# Patient Record
Sex: FEMALE | Race: WHITE | Employment: FULL TIME | URBAN - NONMETROPOLITAN AREA
[De-identification: names, ages, dates, MRNs, and addresses within clinical notes are randomized per-mention and may not be internally consistent; named-entity substitution may affect disease eponyms.]

---

## 2017-01-12 PROBLEM — F41.9 ANXIETY: Status: ACTIVE | Noted: 2017-01-12

## 2020-01-14 PROBLEM — N89.8 VAGINAL DISCHARGE: Status: ACTIVE | Noted: 2020-01-14

## 2020-01-14 PROBLEM — Z12.4 PAP SMEAR FOR CERVICAL CANCER SCREENING: Status: ACTIVE | Noted: 2020-01-14

## 2020-01-14 PROBLEM — N63.0 BREAST MASS IN FEMALE: Status: ACTIVE | Noted: 2020-01-14

## 2020-02-13 PROBLEM — Z12.4 PAP SMEAR FOR CERVICAL CANCER SCREENING: Status: RESOLVED | Noted: 2020-01-14 | Resolved: 2020-02-13

## 2021-05-12 PROBLEM — Z00.00 ENCOUNTER FOR MEDICAL EXAMINATION TO ESTABLISH CARE: Status: ACTIVE | Noted: 2021-05-12

## 2021-05-12 PROBLEM — Z23 NEED FOR PROPHYLACTIC VACCINATION AGAINST STREPTOCOCCUS PNEUMONIAE (PNEUMOCOCCUS): Status: ACTIVE | Noted: 2021-05-12

## 2022-01-11 PROBLEM — Z76.0 MEDICATION REFILL: Status: RESOLVED | Noted: 2021-05-12 | Resolved: 2022-01-11

## 2022-01-11 PROBLEM — N89.8 VAGINAL DISCHARGE: Status: RESOLVED | Noted: 2020-01-14 | Resolved: 2022-01-11

## 2023-01-12 ENCOUNTER — OFFICE VISIT (OUTPATIENT)
Dept: OBGYN CLINIC | Age: 38
End: 2023-01-12
Payer: COMMERCIAL

## 2023-01-12 VITALS
SYSTOLIC BLOOD PRESSURE: 120 MMHG | DIASTOLIC BLOOD PRESSURE: 80 MMHG | HEIGHT: 66 IN | WEIGHT: 174.3 LBS | BODY MASS INDEX: 28.01 KG/M2

## 2023-01-12 DIAGNOSIS — Z83.3 FAMILY HISTORY OF DIABETES MELLITUS: ICD-10-CM

## 2023-01-12 DIAGNOSIS — Z13.29 SCREENING FOR THYROID DISORDER: ICD-10-CM

## 2023-01-12 DIAGNOSIS — Z13.29 SCREENING FOR ENDOCRINE DISORDER: ICD-10-CM

## 2023-01-12 DIAGNOSIS — Z13.89 ENCOUNTER FOR SCREENING FOR OTHER DISORDER: ICD-10-CM

## 2023-01-12 DIAGNOSIS — Z31.49 PROCREATION MANAGEMENT INVESTIGATION AND TESTING: Primary | ICD-10-CM

## 2023-01-12 PROCEDURE — 99203 OFFICE O/P NEW LOW 30 MIN: CPT | Performed by: ADVANCED PRACTICE MIDWIFE

## 2023-01-12 RX ORDER — VENLAFAXINE 75 MG/1
75 TABLET ORAL 3 TIMES DAILY
COMMUNITY

## 2023-01-12 RX ORDER — HYDROXYZINE HYDROCHLORIDE 25 MG/1
25 TABLET, FILM COATED ORAL PRN
COMMUNITY

## 2023-01-12 RX ORDER — BUSPIRONE HYDROCHLORIDE 7.5 MG/1
7.5 TABLET ORAL 3 TIMES DAILY
COMMUNITY

## 2023-01-12 ASSESSMENT — ENCOUNTER SYMPTOMS
RESPIRATORY NEGATIVE: 1
GASTROINTESTINAL NEGATIVE: 1

## 2023-01-12 NOTE — PROGRESS NOTES
PROBLEM VISIT     Date of service: 2023    Cielo House  Is a 30 y.o.  female    PT's PCP is: No primary care provider on file. : 1985                                          HPI New patient here with spouse Pricila Finch) to discuss TTC. Reports has been trying to conceive since between April and 2022. Just recently started BBT at home, uses the [employer] kenny, and has tried [facility] tests (highest 45 per patient \"but just makes me stressed and discouraged\". PCP - name unknown but reports is in [facility] (nothing in [facility] [address]) and is aware of TTC and medications - Buspar, Effexor. Taking OTC PNV. Due for annual - at least 10 years since last pap smear. Patient history:  Cycle onset age 17, regular, bleeds approx 5 days (3 moderate flow, 2 light days). Dysmenorrhea day prior and first day of bleeding. Some clots present - often small, has had quarter size once or twice in the past.   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 Denies pain with BM's. Around mid cycle has sex every other day  ETOH approx once per month  No drug/tobacco use. Partner - Surya Del Valle - 35years old  135#, 5'8\"  No children, healthy  No drug/tobacco use  ETOH 1-2/day  No hx of genital trauma or STI's  Exercise - cross fit  Diet - low vegetables, energy drinks, meat, potatoes, rice      Review of Systems   Constitutional: Negative. HENT: Negative. Respiratory: Negative. Gastrointestinal: Negative. Genitourinary:  Positive for menstrual problem (dysmenorrhea day prior to menses and first day of menses). Musculoskeletal: Negative. Neurological: Negative. Psychiatric/Behavioral: Negative. Patient's last menstrual period was 2023 (exact date). OB History    Para Term  AB Living   0 0 0 0 0 0   SAB IAB Ectopic Molar Multiple Live Births   0 0 0 0 0 0        Social History     Tobacco Use   Smoking Status Never   Smokeless Tobacco Never        Social History     Substance and Sexual Activity   Alcohol Use Yes    Comment: once a month       Allergies: Patient has no known allergies. Current Outpatient Medications:     Prenatal MV-Min-Fe Fum-FA-DHA (PRENATAL 1 PO), Take by mouth, Disp: , Rfl:     hydrOXYzine HCl (ATARAX) 25 MG tablet, Take 25 mg by mouth as needed for Itching, Disp: , Rfl:     FEXOFENADINE HCL PO, Take by mouth, Disp: , Rfl:     busPIRone (BUSPAR) 7.5 MG tablet, Take 7.5 mg by mouth 3 times daily, Disp: , Rfl:     venlafaxine (EFFEXOR) 75 MG tablet, Take 75 mg by mouth 3 times daily, Disp: , Rfl:     Social History     Substance and Sexual Activity   Sexual Activity Yes    Partners: Male       Chief Complaint   Patient presents with    New Patient     Pt here to establish care and discuss trying to concieve. Pt has been trying since 2022. Pt has regular cycles. Pt started doing temperature and is trying ovulation on kenny. Physical Exam  Constitutional:       Appearance: Normal appearance. She is normal weight. HENT:      Head: Normocephalic. Eyes:      Pupils: Pupils are equal, round, and reactive to light. Pulmonary:      Effort: Pulmonary effort is normal.   Musculoskeletal:         General: Normal range of motion. Cervical back: Normal range of motion. Neurological:      Mental Status: She is alert and oriented to person, place, and time. Skin:     General: Skin is warm and dry. Psychiatric:         Behavior: Behavior normal.   Vitals and nursing note reviewed. Vital Signs  Blood pressure 120/80, height 5' 6\" (1.676 m), weight 174 lb 4.8 oz (79.1 kg), last menstrual period 2023. Assessment and Plan          Diagnosis Orders   1. Procreation management investigation and testing  Anti Mullerian Hormone    Progesterone      2. Screening for thyroid disorder  TSH with Reflex      3. Family history of diabetes mellitus  Hemoglobin A1C      4. Encounter for screening for other disorder        5. Body mass index (BMI) 28.0-28.9, adult        6. Screening for endocrine disorder  Insulin, Total    Glucose Tolerance, 2 Hours    Insulin, Free    Hemoglobin A1C        Discussed with patient and her spouse - process of infertility w/u. Discussed possible causes and baseline work up. Will return cycle day 21 (1/19) for fasting labs and annual exam for pap smear/breast exam.  Discussed if IR present then metformin. Discussed if abn thyroid then PCP. Discussed if low progesterone - supplement cycle day 16 through onset of menses or first trimester if gets pregnant. Discussed AMH - if low then RE. Discussed next cycle - call day 1 for a day 43-52 follicle scan - if insufficient then clomid/femara for future cycles. Discussed possible additional w/u with semen analysis and HSG. They are understanding and agreeable. Recommended male DHEA and continue for patient her PNV. I am having Pat Grey maintain her Prenatal MV-Min-Fe Fum-FA-DHA (PRENATAL 1 PO), hydrOXYzine HCl, FEXOFENADINE HCL PO, busPIRone, and venlafaxine. Return in about 1 week (around 1/19/2023) for Yearly. She was also counseled on her preventative health maintenance recommendations and follow-up. There are no Patient Instructions on file for this visit.     GALI Herrera CNM,1/12/2023 8:50 AM                     Electronically signed by GALI Herrera CNM

## 2023-01-19 ENCOUNTER — OFFICE VISIT (OUTPATIENT)
Dept: OBGYN CLINIC | Age: 38
End: 2023-01-19
Payer: COMMERCIAL

## 2023-01-19 ENCOUNTER — HOSPITAL ENCOUNTER (OUTPATIENT)
Age: 38
Setting detail: SPECIMEN
Discharge: HOME | End: 2023-01-19

## 2023-01-19 VITALS
DIASTOLIC BLOOD PRESSURE: 70 MMHG | HEIGHT: 66 IN | BODY MASS INDEX: 28.38 KG/M2 | SYSTOLIC BLOOD PRESSURE: 110 MMHG | WEIGHT: 176.6 LBS

## 2023-01-19 DIAGNOSIS — Z01.419 SMEAR, VAGINAL, AS PART OF ROUTINE GYNECOLOGICAL EXAMINATION: Primary | ICD-10-CM

## 2023-01-19 DIAGNOSIS — Z12.72 SMEAR, VAGINAL, AS PART OF ROUTINE GYNECOLOGICAL EXAMINATION: Primary | ICD-10-CM

## 2023-01-19 DIAGNOSIS — Z83.3 FAMILY HISTORY OF DIABETES MELLITUS: ICD-10-CM

## 2023-01-19 DIAGNOSIS — Z12.4 SCREENING FOR CERVICAL CANCER: ICD-10-CM

## 2023-01-19 DIAGNOSIS — Z31.49 PROCREATION MANAGEMENT INVESTIGATION AND TESTING: ICD-10-CM

## 2023-01-19 DIAGNOSIS — Z13.29 SCREENING FOR ENDOCRINE DISORDER: ICD-10-CM

## 2023-01-19 DIAGNOSIS — Z13.29 SCREENING FOR THYROID DISORDER: ICD-10-CM

## 2023-01-19 LAB
AMOUNT GLUCOSE GIVEN: NORMAL G
GLUCOSE FASTING: 83 MG/DL (ref 65–99)
GLUCOSE TOLERANCE TEST 1 HOUR: 95 MG/DL (ref 65–184)
GLUCOSE TOLERANCE TEST 2 HOUR: 89 MG/DL (ref 65–139)
INSULIN COMMENT: NORMAL
INSULIN REFERENCE RANGE:: NORMAL
INSULIN: 8 MU/L
PROGESTERONE LEVEL: 9.49 NG/ML
TSH SERPL DL<=0.05 MIU/L-ACNC: 0.9 UIU/ML (ref 0.3–5)

## 2023-01-19 PROCEDURE — 99395 PREV VISIT EST AGE 18-39: CPT | Performed by: ADVANCED PRACTICE MIDWIFE

## 2023-01-19 ASSESSMENT — ENCOUNTER SYMPTOMS
ABDOMINAL PAIN: 0
CONSTIPATION: 0
DIARRHEA: 0
GASTROINTESTINAL NEGATIVE: 1
RESPIRATORY NEGATIVE: 1
SHORTNESS OF BREATH: 0

## 2023-01-19 NOTE — PROGRESS NOTES
YEARLY PHYSICAL    Date of service: 2023    Kate Jones  Is a 30 y.o.   female    PT's PCP is: No primary care provider on file. : 1985                                             Subjective:       Patient's last menstrual period was 2023 (exact date). Are your menses regular: yes    OB History    Para Term  AB Living   0 0 0 0 0 0   SAB IAB Ectopic Molar Multiple Live Births   0 0 0 0 0 0        Social History     Tobacco Use   Smoking Status Never   Smokeless Tobacco Never        Social History     Substance and Sexual Activity   Alcohol Use Yes    Comment: once a month       Family History   Problem Relation Age of Onset    Heart Disease Paternal Grandfather     Diabetes Paternal Grandmother     Diabetes Maternal Grandmother     Diabetes Maternal Grandfather        Any family history of breast or ovarian cancer: No    Any family history of blood clots: No      Allergies: Patient has no known allergies.       Current Outpatient Medications:     Prenatal MV-Min-Fe Fum-FA-DHA (PRENATAL 1 PO), Take by mouth, Disp: , Rfl:     hydrOXYzine HCl (ATARAX) 25 MG tablet, Take 25 mg by mouth as needed for Itching, Disp: , Rfl:     FEXOFENADINE HCL PO, Take by mouth, Disp: , Rfl:     busPIRone (BUSPAR) 7.5 MG tablet, Take 7.5 mg by mouth 3 times daily, Disp: , Rfl:     venlafaxine (EFFEXOR) 75 MG tablet, Take 75 mg by mouth 3 times daily, Disp: , Rfl:     Social History     Substance and Sexual Activity   Sexual Activity Yes    Partners: Male       Any bleeding or pain with intercourse: No    Last Yearly:  First annual in our office    Last pap: >10 years per patient    Do you do self breast exams: Encouraged    Past Medical History:   Diagnosis Date    Anxiety     Depression        Past Surgical History:   Procedure Laterality Date    TONSILLECTOMY         Family History   Problem Relation Age of Onset    Heart Disease Paternal Grandfather     Diabetes Paternal Grandmother     Diabetes Maternal Grandmother     Diabetes Maternal Grandfather        Chief Complaint   Patient presents with    Annual Exam     Pap due. Pt denies concerns. Labs:    No results found for this visit on 01/19/23. HPI:  Annual.  Denies breast/pelvic concerns. Menses regular. Monogamous relationship. Due for pap smear. Doing labs today for fertility evaluation. Review of Systems   Constitutional: Negative. Negative for chills, fatigue and fever. HENT: Negative. Respiratory: Negative. Negative for shortness of breath. Cardiovascular: Negative. Negative for chest pain. Gastrointestinal: Negative. Negative for abdominal pain, constipation and diarrhea. Genitourinary:  Negative for dysuria, enuresis, frequency, menstrual problem, pelvic pain, urgency and vaginal bleeding. Musculoskeletal: Negative. Neurological: Negative. Negative for dizziness, light-headedness and headaches. Psychiatric/Behavioral: Negative. Objective  Blood pressure 110/70, height 5' 6\" (1.676 m), weight 176 lb 9.6 oz (80.1 kg), last menstrual period 01/03/2023. Physical Exam  Constitutional:       Appearance: Normal appearance. She is normal weight. Genitourinary:      Vulva, bladder and urethral meatus normal.      No vaginal discharge or tenderness. No vaginal prolapse present. Right Adnexa: not tender. Left Adnexa: not tender. No cervical motion tenderness or discharge. Uterus is not tender. Pelvic exam was performed with patient in the lithotomy position. Breasts:     Breasts are symmetrical.      Breasts are soft. Right: No mass, nipple discharge, skin change or tenderness. Left: No mass, nipple discharge, skin change or tenderness. HENT:      Head: Normocephalic. Eyes:      Pupils: Pupils are equal, round, and reactive to light.    Cardiovascular:      Rate and Rhythm: Normal rate and regular rhythm. Pulses: Normal pulses. Heart sounds: Normal heart sounds. No murmur heard. Pulmonary:      Effort: Pulmonary effort is normal.      Breath sounds: Normal breath sounds. No wheezing. Abdominal:      Palpations: Abdomen is soft. Tenderness: There is no abdominal tenderness. Musculoskeletal:         General: Normal range of motion. Cervical back: Normal range of motion. No muscular tenderness. Neurological:      Mental Status: She is alert and oriented to person, place, and time. Skin:     General: Skin is warm and dry. Psychiatric:         Behavior: Behavior normal.   Vitals and nursing note reviewed. Chaperone present: Declined. Assessment and Plan          Diagnosis Orders   1. Smear, vaginal, as part of routine gynecological examination        2. Screening for cervical cancer  PAP SMEAR          Repeat Annual every 1 year  Cervical Cytology Evaluation begins at 24years old. If Negative Cytology, Follow-up screening per current guidelines. Mammograms every 1year. If 35 yo and last mammogram was negative. Calcium and Vitamin D dosing reviewed. Birth control and barrier recommendationsdiscussed. STD counseling and prevention reviewed. Routine healthmaintenance per patients PCP. I am having Maggi Ax maintain her Prenatal MV-Min-Fe Fum-FA-DHA (PRENATAL 1 PO), hydrOXYzine HCl, FEXOFENADINE HCL PO, busPIRone, and venlafaxine. Return in about 1 year (around 1/19/2024) for Yearly. She was also counseled on her preventative health maintenance recommendations and follow-up. There are no Patient Instructions on file for this visit.     Portia 6, APRN - CNM,1/19/2023 10:00 AM

## 2023-01-20 LAB
ESTIMATED AVERAGE GLUCOSE: 94 MG/DL
HBA1C MFR BLD: 4.9 % (ref 4–6)

## 2023-01-21 LAB
INSULIN FREE: 6 UIU/ML (ref 3–25)
INSULIN: 6 UIU/ML (ref 3–25)

## 2023-01-22 LAB — ANTI-MULLERIAN HORMONE: 4.47 NG/ML (ref 0.18–11.71)

## 2023-02-06 ENCOUNTER — OFFICE VISIT (OUTPATIENT)
Dept: OBGYN CLINIC | Age: 38
End: 2023-02-06
Payer: COMMERCIAL

## 2023-02-06 VITALS
HEIGHT: 66 IN | DIASTOLIC BLOOD PRESSURE: 80 MMHG | BODY MASS INDEX: 28.64 KG/M2 | WEIGHT: 178.2 LBS | SYSTOLIC BLOOD PRESSURE: 118 MMHG

## 2023-02-06 DIAGNOSIS — N97.0 ANOVULATION: ICD-10-CM

## 2023-02-06 DIAGNOSIS — Z31.49 PROCREATION MANAGEMENT INVESTIGATION AND TESTING: Primary | ICD-10-CM

## 2023-02-06 PROCEDURE — 99213 OFFICE O/P EST LOW 20 MIN: CPT | Performed by: ADVANCED PRACTICE MIDWIFE

## 2023-02-06 ASSESSMENT — ENCOUNTER SYMPTOMS: RESPIRATORY NEGATIVE: 1

## 2023-02-06 NOTE — PROGRESS NOTES
PROBLEM VISIT     Date of service: 2023    Lucretia Qureshi  Is a 30 y.o.  female    PT's PCP is: No primary care provider on file. : 1985                                          HPI Here for follicle scan f/u. Cycle day 12. Spouse is with her today. D enies concerns. Has progesterone supp at home - starting them cycle day 16 this week. Recently received albuterol script for prn use from PCP. Review of Systems   Constitutional: Negative. HENT: Negative. Respiratory: Negative. Genitourinary: Negative. Neurological: Negative. Psychiatric/Behavioral: Negative. Patient's last menstrual period was 2023 (exact date). OB History    Para Term  AB Living   0 0 0 0 0 0   SAB IAB Ectopic Molar Multiple Live Births   0 0 0 0 0 0        Social History     Tobacco Use   Smoking Status Never   Smokeless Tobacco Never        Social History     Substance and Sexual Activity   Alcohol Use Yes    Comment: once a month       Allergies: Patient has no known allergies. Current Outpatient Medications:     ALBUTEROL IN, Inhale into the lungs, Disp: , Rfl:     progesterone (ENDOMETRIN) 100 MG suppository, Place 1 suppository vaginally at bedtime Cycle day 16 through onset of menses or through first trimester, Disp: 30 suppository, Rfl: 3    Prenatal MV-Min-Fe Fum-FA-DHA (PRENATAL 1 PO), Take by mouth, Disp: , Rfl:     hydrOXYzine HCl (ATARAX) 25 MG tablet, Take 25 mg by mouth as needed for Itching, Disp: , Rfl:     FEXOFENADINE HCL PO, Take by mouth, Disp: , Rfl:     busPIRone (BUSPAR) 7.5 MG tablet, Take 7.5 mg by mouth 3 times daily, Disp: , Rfl:     venlafaxine (EFFEXOR) 75 MG tablet, Take 75 mg by mouth 3 times daily, Disp: , Rfl:     Social History     Substance and Sexual Activity   Sexual Activity Yes    Partners: Male       Chief Complaint   Patient presents with    Follow-up     Pt here for Day 12 USN f/u.  Pt has questions about progesterone she will be taking. Physical Exam  Constitutional:       Appearance: Normal appearance. She is normal weight. HENT:      Head: Normocephalic. Eyes:      Pupils: Pupils are equal, round, and reactive to light. Pulmonary:      Effort: Pulmonary effort is normal.   Musculoskeletal:         General: Normal range of motion. Cervical back: Normal range of motion. Neurological:      Mental Status: She is alert and oriented to person, place, and time. Skin:     General: Skin is warm and dry. Psychiatric:         Behavior: Behavior normal.   Vitals and nursing note reviewed. Chaperone present: [address] Student present for exam.       Vital Signs  Blood pressure 118/80, height 5' 6\" (1.676 m), weight 178 lb 3.2 oz (80.8 kg), last menstrual period 01/26/2023. Results reviewed today:    USN today  Cycle day 12  Uterus 7.3 x 3.9 x 3.4cm  Endometrium 6.4mm  Probable polyp 0.9 x 0.6 x 3.2IN  Largest follicle on the right ovary 1.5 x 1.1 x 1.1 cm  Largest follicle on the left ovary 0.9 x 0.7 x 0.8cm                            Assessment and Plan          Diagnosis Orders   1. Procreation management investigation and testing        2. Anovulation          Discussed follicle size today <2YC therefore no trigger shot. Discussed if no pregnancy this cycle can use clomid 50mg next cycle - call day 1 for script and to schedule follicle scan     Will continue with plan for progesterone cycle day 16 through menses or if gets pregnant then through first trimester. Call for changes/concerns. I am having Dayna Tomas maintain her Prenatal MV-Min-Fe Fum-FA-DHA (PRENATAL 1 PO), hydrOXYzine HCl, FEXOFENADINE HCL PO, busPIRone, venlafaxine, progesterone, and ALBUTEROL IN. No follow-ups on file. She was also counseled on her preventative health maintenance recommendations and follow-up. There are no Patient Instructions on file for this visit.     ELIZABETH GONZALES, GALI Ring CNM,2/6/2023 11:56 AM                     Electronically signed by GALI Ding CNM

## 2023-02-16 ENCOUNTER — TELEPHONE (OUTPATIENT)
Dept: OBGYN CLINIC | Age: 38
End: 2023-02-16

## 2023-02-16 NOTE — TELEPHONE ENCOUNTER
Pt calling stating she recently started progesterone suppositories and has been noticing some chest tightness. Denies pain or any cold sx. Spoke with KOFFI Clark CNM about pt's sx and she advised pt to decrease dose to every other day and see if that helps. If sx not relieved by decreasing dose, pt is to d/c. Pt agreeable.

## 2023-02-27 NOTE — TELEPHONE ENCOUNTER
Patient called. She started her cycle and needs clomid rx sent to [facility] on [address].

## 2023-03-07 ENCOUNTER — OFFICE VISIT (OUTPATIENT)
Dept: OBGYN CLINIC | Age: 38
End: 2023-03-07
Payer: COMMERCIAL

## 2023-03-07 ENCOUNTER — NURSE ONLY (OUTPATIENT)
Dept: OBGYN CLINIC | Age: 38
End: 2023-03-07
Payer: COMMERCIAL

## 2023-03-07 VITALS
SYSTOLIC BLOOD PRESSURE: 110 MMHG | WEIGHT: 179.6 LBS | DIASTOLIC BLOOD PRESSURE: 72 MMHG | BODY MASS INDEX: 28.87 KG/M2 | HEIGHT: 66 IN

## 2023-03-07 DIAGNOSIS — Z31.49 PROCREATION MANAGEMENT INVESTIGATION AND TESTING: Primary | ICD-10-CM

## 2023-03-07 PROCEDURE — 99213 OFFICE O/P EST LOW 20 MIN: CPT | Performed by: ADVANCED PRACTICE MIDWIFE

## 2023-03-07 RX ORDER — CHORIONIC GONADOTROPIN 10000 UNIT
1 KIT INTRAMUSCULAR ONCE
Status: COMPLETED | OUTPATIENT
Start: 2023-03-07 | End: 2023-03-07

## 2023-03-07 RX ADMIN — CHORIONIC GONADOTROPIN 1 ML: KIT at 16:27

## 2023-03-07 ASSESSMENT — ENCOUNTER SYMPTOMS
RESPIRATORY NEGATIVE: 1
GASTROINTESTINAL NEGATIVE: 1

## 2023-03-07 NOTE — PROGRESS NOTES
PROBLEM VISIT     Date of service: 3/7/2023    Kennedy Aleman  Is a 30 y.o.  female    PT's PCP is: No primary care provider on file. : 1985                                          HPI Here for Usn f/u. First cycle with Clomid 50mg cycle day 4-8. Denies concerns aside from \"just more emotional\" this month. Review of Systems   Constitutional: Negative. HENT: Negative. Respiratory: Negative. Gastrointestinal: Negative. Genitourinary: Negative. Neurological: Negative. Psychiatric/Behavioral:  Positive for dysphoric mood. Patient's last menstrual period was 2023 (exact date). OB History    Para Term  AB Living   0 0 0 0 0 0   SAB IAB Ectopic Molar Multiple Live Births   0 0 0 0 0 0        Social History     Tobacco Use   Smoking Status Never   Smokeless Tobacco Never        Social History     Substance and Sexual Activity   Alcohol Use Yes    Comment: once a month       Allergies: Patient has no known allergies.       Current Outpatient Medications:     clomiPHENE (CLOMID) 50 MG tablet, Take 1 tablet by mouth daily Take cycle day 4-8., Disp: 5 tablet, Rfl: 0    ALBUTEROL IN, Inhale into the lungs, Disp: , Rfl:     progesterone (ENDOMETRIN) 100 MG suppository, Place 1 suppository vaginally at bedtime Cycle day 16 through onset of menses or through first trimester, Disp: 30 suppository, Rfl: 3    Prenatal MV-Min-Fe Fum-FA-DHA (PRENATAL 1 PO), Take by mouth, Disp: , Rfl:     hydrOXYzine HCl (ATARAX) 25 MG tablet, Take 25 mg by mouth as needed for Itching, Disp: , Rfl:     FEXOFENADINE HCL PO, Take by mouth, Disp: , Rfl:     busPIRone (BUSPAR) 7.5 MG tablet, Take 7.5 mg by mouth 3 times daily, Disp: , Rfl:     venlafaxine (EFFEXOR) 75 MG tablet, Take 75 mg by mouth 3 times daily, Disp: , Rfl:     Social History     Substance and Sexual Activity   Sexual Activity Yes    Partners: Male       Chief Complaint   Patient presents with    Other     Pt here for follicle USN f/u. Pt denies any new concerns. Physical Exam  Constitutional:       Appearance: Normal appearance. She is normal weight. HENT:      Head: Normocephalic. Eyes:      Pupils: Pupils are equal, round, and reactive to light. Pulmonary:      Effort: Pulmonary effort is normal.   Musculoskeletal:         General: Normal range of motion. Cervical back: Normal range of motion. Neurological:      Mental Status: She is alert and oriented to person, place, and time. Skin:     General: Skin is warm and dry. Psychiatric:         Behavior: Behavior normal.   Vitals and nursing note reviewed. Vital Signs  Blood pressure 110/72, height 5' 6\" (1.676 m), weight 179 lb 9.6 oz (81.5 kg), last menstrual period 02/26/2023. Results reviewed today:    USN today  Cycle day 10  Uterus 6.8 x 3.6 x 3.0cm  Endometrium 6.8mm  Polyp again seen within the endometrium 0.9 x 0.4 x 1.6JK  Largest follicle on the right 1.1 x 1.0 x 1.2cm  Left ovary with 2 follicles: 1.5 x 1.8 x 9.0XJ, 1.6 x 1.1 x 1.7cm                              Assessment and Plan          Diagnosis Orders   1. Procreation management investigation and testing          Discussed options - trigger shot today, timed intercourse, no HPT for at least 14 days then if misses cycle and has positive test then labs in our office. If no  pregnancy then repeat dosing next month  Second option - no trigger shot today, consider timed intercourse and if no conception then next month same plan as had this month    Patient will talk to spouse - if decides to proceed with trigger shot, we will call it into [facility] for patient to return here today and receive injection        I am having Mandy Willoughby maintain her Prenatal MV-Min-Fe Fum-FA-DHA (PRENATAL 1 PO), hydrOXYzine HCl, FEXOFENADINE HCL PO, busPIRone, venlafaxine, progesterone, ALBUTEROL IN, and clomiPHENE. No follow-ups on file.     She was also counseled on her preventative health maintenance recommendations and follow-up. There are no Patient Instructions on file for this visit.     GALI Myers CNM,3/7/2023 12:24 PM                     Electronically signed by GALI Myers CNM

## 2023-03-27 RX ORDER — CLOMIPHENE CITRATE 50 MG/1
TABLET ORAL
Qty: 5 TABLET | Refills: 0 | Status: SHIPPED | OUTPATIENT
Start: 2023-03-27

## 2023-03-27 NOTE — TELEPHONE ENCOUNTER
Patient called back. started her cycle on Saturday so she does request refill. She is aware to call us and was just getting ready to, she is unsure why pharmacy sent automated request.  Call transferred up from to schedule day 10-12 usn and follow-up.

## 2023-03-27 NOTE — TELEPHONE ENCOUNTER
Please call patient - this is automated refill request - she should call us cycle day 1 for a refill and to schedule a day 16-52 follicle scan at that time.

## 2023-03-28 RX ORDER — CLOMIPHENE CITRATE 50 MG/1
TABLET ORAL
Qty: 5 TABLET | Refills: 0 | OUTPATIENT
Start: 2023-03-28

## 2023-04-04 ENCOUNTER — TELEPHONE (OUTPATIENT)
Dept: OBGYN CLINIC | Age: 38
End: 2023-04-04

## 2023-04-04 ENCOUNTER — OFFICE VISIT (OUTPATIENT)
Dept: OBGYN CLINIC | Age: 38
End: 2023-04-04
Payer: COMMERCIAL

## 2023-04-04 VITALS
WEIGHT: 178 LBS | HEIGHT: 66 IN | SYSTOLIC BLOOD PRESSURE: 110 MMHG | BODY MASS INDEX: 28.61 KG/M2 | DIASTOLIC BLOOD PRESSURE: 70 MMHG

## 2023-04-04 DIAGNOSIS — Z31.49 PROCREATION MANAGEMENT INVESTIGATION AND TESTING: Primary | ICD-10-CM

## 2023-04-04 PROCEDURE — 99213 OFFICE O/P EST LOW 20 MIN: CPT | Performed by: ADVANCED PRACTICE MIDWIFE

## 2023-04-04 PROCEDURE — 96372 THER/PROPH/DIAG INJ SC/IM: CPT | Performed by: ADVANCED PRACTICE MIDWIFE

## 2023-04-04 RX ORDER — CHORIONIC GONADOTROPIN 10000 UNIT
10000 KIT INTRAMUSCULAR ONCE
Status: COMPLETED | OUTPATIENT
Start: 2023-04-04 | End: 2023-04-04

## 2023-04-04 RX ORDER — CHORIONIC GONADOTROPIN 10000 UNIT
10000 KIT INTRAMUSCULAR ONCE
Status: CANCELLED | OUTPATIENT
Start: 2023-04-04 | End: 2023-04-04

## 2023-04-04 RX ADMIN — CHORIONIC GONADOTROPIN 10000 UNITS: KIT at 16:34

## 2023-04-04 ASSESSMENT — ENCOUNTER SYMPTOMS
GASTROINTESTINAL NEGATIVE: 1
RESPIRATORY NEGATIVE: 1

## 2023-04-04 NOTE — PROGRESS NOTES
PROBLEM VISIT     Date of service: 2023    Oren Gupta  Is a 30 y.o.  female    PT's PCP is: No primary care provider on file. : 1985                                          HPI Here with spouse for follicle scan f/u. This was round #2 of medication - clomid 50mg x 5 days. Denies additional concerns today. Did better emotionally this time \"just knew what to expect\"    Review of Systems   Constitutional: Negative. HENT: Negative. Respiratory: Negative. Gastrointestinal: Negative. Genitourinary:  Negative for menstrual problem and pelvic pain. Neurological: Negative. Psychiatric/Behavioral: Negative. Patient's last menstrual period was 2023 (exact date). OB History    Para Term  AB Living   0 0 0 0 0 0   SAB IAB Ectopic Molar Multiple Live Births   0 0 0 0 0 0        Social History     Tobacco Use   Smoking Status Never   Smokeless Tobacco Never        Social History     Substance and Sexual Activity   Alcohol Use Yes    Comment: once a month       Allergies: Patient has no known allergies.       Current Outpatient Medications:     Chorionic Gonadotropin 5000 units SOLR, Inject 5,000 Units into the muscle once for 1 dose, Disp: 1 each, Rfl: 0    progesterone (ENDOMETRIN) 100 MG suppository, Cycle day 16 through menses or if becomes pregnant then through first trimester, Disp: 30 suppository, Rfl: 3    ALBUTEROL IN, Inhale into the lungs, Disp: , Rfl:     Prenatal MV-Min-Fe Fum-FA-DHA (PRENATAL 1 PO), Take by mouth, Disp: , Rfl:     hydrOXYzine HCl (ATARAX) 25 MG tablet, Take 1 tablet by mouth as needed for Itching, Disp: , Rfl:     FEXOFENADINE HCL PO, Take by mouth, Disp: , Rfl:     busPIRone (BUSPAR) 7.5 MG tablet, Take 1 tablet by mouth 3 times daily, Disp: , Rfl:     venlafaxine (EFFEXOR) 75 MG tablet, Take 1 tablet by mouth 3 times daily, Disp: , Rfl:     CLOMID 50 MG tablet, TAKE ONE TABLET BY MOUTH DAILY ON DAYS 4-8 OF CYCLE, Disp: 5

## 2023-04-04 NOTE — TELEPHONE ENCOUNTER
I spoke to patient when she was here for trigger shot. She will call on day 1 of her next cycle to schedule if needed. Patient verbalized understanding, no further questions/concerns voiced.

## 2023-04-21 ENCOUNTER — OFFICE VISIT (OUTPATIENT)
Dept: FAMILY MEDICINE CLINIC | Age: 38
End: 2023-04-21
Payer: COMMERCIAL

## 2023-04-21 VITALS
OXYGEN SATURATION: 98 % | WEIGHT: 180 LBS | DIASTOLIC BLOOD PRESSURE: 76 MMHG | TEMPERATURE: 97.4 F | SYSTOLIC BLOOD PRESSURE: 114 MMHG | HEART RATE: 76 BPM | BODY MASS INDEX: 28.93 KG/M2 | HEIGHT: 66 IN

## 2023-04-21 DIAGNOSIS — R53.83 FATIGUE, UNSPECIFIED TYPE: ICD-10-CM

## 2023-04-21 DIAGNOSIS — Z13.1 SCREENING FOR DIABETES MELLITUS: ICD-10-CM

## 2023-04-21 DIAGNOSIS — Z13.220 SCREENING, LIPID: ICD-10-CM

## 2023-04-21 DIAGNOSIS — F41.8 MIXED ANXIETY AND DEPRESSIVE DISORDER: Primary | ICD-10-CM

## 2023-04-21 PROCEDURE — 99214 OFFICE O/P EST MOD 30 MIN: CPT | Performed by: STUDENT IN AN ORGANIZED HEALTH CARE EDUCATION/TRAINING PROGRAM

## 2023-04-21 RX ORDER — VENLAFAXINE HYDROCHLORIDE 75 MG/1
75 CAPSULE, EXTENDED RELEASE ORAL DAILY
Qty: 90 CAPSULE | Refills: 1 | Status: SHIPPED | OUTPATIENT
Start: 2023-04-21 | End: 2023-05-21

## 2023-04-21 RX ORDER — BUSPIRONE HYDROCHLORIDE 7.5 MG/1
7.5 TABLET ORAL 2 TIMES DAILY
Qty: 180 TABLET | Refills: 1 | Status: SHIPPED | OUTPATIENT
Start: 2023-04-21 | End: 2023-05-21

## 2023-04-21 RX ORDER — HYDROXYZINE HYDROCHLORIDE 25 MG/1
25 TABLET, FILM COATED ORAL EVERY 8 HOURS PRN
Qty: 30 TABLET | Refills: 1 | Status: SHIPPED | OUTPATIENT
Start: 2023-04-21 | End: 2023-05-01

## 2023-04-21 SDOH — ECONOMIC STABILITY: FOOD INSECURITY: WITHIN THE PAST 12 MONTHS, YOU WORRIED THAT YOUR FOOD WOULD RUN OUT BEFORE YOU GOT MONEY TO BUY MORE.: NEVER TRUE

## 2023-04-21 SDOH — ECONOMIC STABILITY: FOOD INSECURITY: WITHIN THE PAST 12 MONTHS, THE FOOD YOU BOUGHT JUST DIDN'T LAST AND YOU DIDN'T HAVE MONEY TO GET MORE.: NEVER TRUE

## 2023-04-21 SDOH — ECONOMIC STABILITY: TRANSPORTATION INSECURITY
IN THE PAST 12 MONTHS, HAS THE LACK OF TRANSPORTATION KEPT YOU FROM MEDICAL APPOINTMENTS OR FROM GETTING MEDICATIONS?: NO

## 2023-04-21 SDOH — ECONOMIC STABILITY: TRANSPORTATION INSECURITY
IN THE PAST 12 MONTHS, HAS LACK OF TRANSPORTATION KEPT YOU FROM MEETINGS, WORK, OR FROM GETTING THINGS NEEDED FOR DAILY LIVING?: NO

## 2023-04-21 SDOH — ECONOMIC STABILITY: INCOME INSECURITY: IN THE LAST 12 MONTHS, WAS THERE A TIME WHEN YOU WERE NOT ABLE TO PAY THE MORTGAGE OR RENT ON TIME?: NO

## 2023-04-21 SDOH — ECONOMIC STABILITY: HOUSING INSECURITY
IN THE LAST 12 MONTHS, WAS THERE A TIME WHEN YOU DID NOT HAVE A STEADY PLACE TO SLEEP OR SLEPT IN A SHELTER (INCLUDING NOW)?: NO

## 2023-04-21 ASSESSMENT — PATIENT HEALTH QUESTIONNAIRE - PHQ9
3. TROUBLE FALLING OR STAYING ASLEEP: 0
SUM OF ALL RESPONSES TO PHQ QUESTIONS 1-9: 0
10. IF YOU CHECKED OFF ANY PROBLEMS, HOW DIFFICULT HAVE THESE PROBLEMS MADE IT FOR YOU TO DO YOUR WORK, TAKE CARE OF THINGS AT HOME, OR GET ALONG WITH OTHER PEOPLE: 0
SUM OF ALL RESPONSES TO PHQ QUESTIONS 1-9: 0
8. MOVING OR SPEAKING SO SLOWLY THAT OTHER PEOPLE COULD HAVE NOTICED. OR THE OPPOSITE, BEING SO FIGETY OR RESTLESS THAT YOU HAVE BEEN MOVING AROUND A LOT MORE THAN USUAL: 0
SUM OF ALL RESPONSES TO PHQ9 QUESTIONS 1 & 2: 0
6. FEELING BAD ABOUT YOURSELF - OR THAT YOU ARE A FAILURE OR HAVE LET YOURSELF OR YOUR FAMILY DOWN: 0
7. TROUBLE CONCENTRATING ON THINGS, SUCH AS READING THE NEWSPAPER OR WATCHING TELEVISION: 0
4. FEELING TIRED OR HAVING LITTLE ENERGY: 0
SUM OF ALL RESPONSES TO PHQ QUESTIONS 1-9: 0
1. LITTLE INTEREST OR PLEASURE IN DOING THINGS: 0
9. THOUGHTS THAT YOU WOULD BE BETTER OFF DEAD, OR OF HURTING YOURSELF: 0
SUM OF ALL RESPONSES TO PHQ QUESTIONS 1-9: 0
5. POOR APPETITE OR OVEREATING: 0
2. FEELING DOWN, DEPRESSED OR HOPELESS: 0

## 2023-04-21 ASSESSMENT — SOCIAL DETERMINANTS OF HEALTH (SDOH): HOW HARD IS IT FOR YOU TO PAY FOR THE VERY BASICS LIKE FOOD, HOUSING, MEDICAL CARE, AND HEATING?: NOT HARD AT ALL

## 2023-04-21 NOTE — PROGRESS NOTES
[address] PRIMARY CARE  [address] [place] [address] [address] [place] [address]  Dept: [phone]  Dept Fax: [phone]    Fredy Garcia is a 30 y.o. female who is a Established patient, who presents today for her medical conditions/complaints as noted below:  Chief Complaint   Patient presents with    Anxiety    Depression    Medication Check         HPI:     She is here today to follow-up on anxiety and depression. She states that she has been doing well on the Effexor and BuSpar and has not had any issues. She does complain of fatigue and tiredness. She is also due for her routine labs. She follows with OB/GYN for her routine Pap smears.           No results found for: LABA1C          ( goal A1Cis < 7)   No results found for: LABMICR  No results found for: LDLCHOLESTEROL, LDLCALC    (goal LDL is <100)   AST (U/L)   Date Value   01/04/2018 18     ALT (U/L)   Date Value   01/04/2018 12     BUN (mg/dL)   Date Value   01/04/2018 9     BP Readings from Last 3 Encounters:   04/21/23 114/76   12/05/22 108/76   05/12/21 112/78          (goal 120/80)    Past Medical History:   Diagnosis Date    Anxiety     Asthma     Depression     Medication refill 5/12/2021    Need for prophylactic vaccination against Streptococcus pneumoniae (pneumococcus) 5/12/2021    Vaginal discharge 1/14/2020      Past Surgical History:   Procedure Laterality Date    TONSILLECTOMY         Family History   Problem Relation Age of Onset    Diabetes Father     High Blood Pressure Father     Heart Disease Father         cabg     Other Father     Dementia Father     Depression Brother     Depression Sister        Social History     Tobacco Use    Smoking status: Never    Smokeless tobacco: Never   Substance Use Topics    Alcohol use: No     Alcohol/week: 0.0 standard drinks      Current Outpatient Medications   Medication Sig Dispense Refill    venlafaxine (EFFEXOR XR) 75 MG extended release capsule Take 1 capsule by mouth daily

## 2023-04-23 ASSESSMENT — ENCOUNTER SYMPTOMS
WHEEZING: 0
CONSTIPATION: 0
SORE THROAT: 0
COUGH: 0
SHORTNESS OF BREATH: 0
NAUSEA: 0
VOMITING: 0
CHEST TIGHTNESS: 0
ABDOMINAL PAIN: 0
EYE DISCHARGE: 0
DIARRHEA: 0

## 2023-04-24 ENCOUNTER — TELEPHONE (OUTPATIENT)
Dept: OBGYN CLINIC | Age: 38
End: 2023-04-24

## 2023-04-24 DIAGNOSIS — N97.0 INFERTILITY ASSOCIATED WITH ANOVULATION: Primary | ICD-10-CM

## 2023-04-24 NOTE — TELEPHONE ENCOUNTER
I will get order for spouse and have it at  with instructions and collection cup  Order was faxed to [facility] for HSG  I can send Clomid for her - same dose

## 2023-04-24 NOTE — TELEPHONE ENCOUNTER
Pt calling stating she started her menses today. Pt to do HSG this cycle per your note. Order faxed to [facility] and they will call pt to schedule. Needs order for her spouse to do SA and wondered if she would still take clomid this cycle. Please advise.

## 2023-05-01 ENCOUNTER — TELEPHONE (OUTPATIENT)
Dept: OBGYN CLINIC | Age: 38
End: 2023-05-01

## 2023-05-01 DIAGNOSIS — N97.0 INFERTILITY ASSOCIATED WITH ANOVULATION: ICD-10-CM

## 2023-05-01 NOTE — TELEPHONE ENCOUNTER
Patient called. She is aware of HSG results and that spouse needs to follow-up with urology regarding semen analysis before any further treatment.

## 2023-05-02 NOTE — TELEPHONE ENCOUNTER
Unfortunately his ph was a little elevated which can cause sperm to not live as long and the motility was slower than should be which may also impact its ability to fertilize an egg  With patient normal work up we have two options - have had follicles, done trigger shots, have normal HSG and still no pregnancy - can continue but often after 3-4 rounds if still no pregnancy we refer to Re. Otherwise second option is to refer to RE now for second opinion and possibly IUI.

## 2023-05-02 NOTE — TELEPHONE ENCOUNTER
Patient's  Adeel Peña called this morning stating that he did have an appointment with Serjio Guerrero CNP at Urology this morning and his SA were in normal range and nothing further needed done. They are questioning the next step. Can you please review and give recommendations?

## 2023-08-01 DIAGNOSIS — F41.8 MIXED ANXIETY AND DEPRESSIVE DISORDER: ICD-10-CM

## 2023-08-01 RX ORDER — VENLAFAXINE HYDROCHLORIDE 75 MG/1
75 CAPSULE, EXTENDED RELEASE ORAL DAILY
Qty: 90 CAPSULE | Refills: 1 | Status: SHIPPED | OUTPATIENT
Start: 2023-08-01 | End: 2023-08-31

## 2023-08-01 NOTE — TELEPHONE ENCOUNTER
Dharmesh Aguilar is calling to request a refill on the following medication(s):    Medication Request:  Requested Prescriptions     Pending Prescriptions Disp Refills    venlafaxine (EFFEXOR XR) 75 MG extended release capsule 90 capsule 1     Sig: Take 1 capsule by mouth daily       Last Visit Date (If Applicable):  3/19/5154    Next Visit Date:    10/20/2023

## 2023-10-02 NOTE — TELEPHONE ENCOUNTER
Britt Esteves is calling to request a refill on the following medication(s):    Medication Request:  Requested Prescriptions     Pending Prescriptions Disp Refills    busPIRone (BUSPAR) 7.5 MG tablet       Sig: Take 1 tablet by mouth 3 times daily       Last Visit Date (If Applicable):  1/42/2895    Next Visit Date:    10/20/2023

## 2023-10-03 RX ORDER — BUSPIRONE HYDROCHLORIDE 7.5 MG/1
7.5 TABLET ORAL 3 TIMES DAILY
Qty: 60 TABLET | Refills: 1 | Status: SHIPPED | OUTPATIENT
Start: 2023-10-03

## 2023-11-06 DIAGNOSIS — F41.8 MIXED ANXIETY AND DEPRESSIVE DISORDER: ICD-10-CM

## 2023-11-06 NOTE — TELEPHONE ENCOUNTER
----- Message from Karmen Toussaint sent at 11/6/2023  9:56 AM EST -----  Subject: Refill Request    QUESTIONS  Name of Medication? venlafaxine (EFFEXOR XR) 75 MG extended release   capsule  Patient-reported dosage and instructions? Patient takes 1 capsule each   morning  How many days do you have left? 0  Preferred Pharmacy? [id number] [facility] [id number]  Pharmacy phone number (if available)? [phone]  Additional Information for Provider? Patient has an upcoming virtual   appointment on 11/13/23 @ 12 noon, but is already out of this medication. Please refill. Thanks.  ---------------------------------------------------------------------------  --------------  Lili STOCK  What is the best way for the office to contact you? OK to leave message on   [email or web address],OK to respond with electronic message via [employer] portal (only   for patients who have registered [employer] account)  Preferred Call Back Phone Number? [phone]  ---------------------------------------------------------------------------  --------------  SCRIPT ANSWERS  Relationship to Patient?  Self

## 2023-11-06 NOTE — TELEPHONE ENCOUNTER
Sol Jeter is calling to request a refill on the following medication(s):    Medication Request:  Requested Prescriptions     Pending Prescriptions Disp Refills    venlafaxine (EFFEXOR XR) 75 MG extended release capsule 90 capsule 1     Sig: Take 1 capsule by mouth daily       Last Visit Date (If Applicable):  6/17/8624    Next Visit Date:    11/13/2023

## 2023-11-07 RX ORDER — VENLAFAXINE HYDROCHLORIDE 75 MG/1
75 CAPSULE, EXTENDED RELEASE ORAL DAILY
Qty: 90 CAPSULE | Refills: 1 | Status: SHIPPED | OUTPATIENT
Start: 2023-11-07 | End: 2024-05-05

## 2023-11-13 ENCOUNTER — TELEMEDICINE (OUTPATIENT)
Dept: FAMILY MEDICINE CLINIC | Age: 38
End: 2023-11-13
Payer: COMMERCIAL

## 2023-11-13 DIAGNOSIS — Z13.220 SCREENING, LIPID: ICD-10-CM

## 2023-11-13 DIAGNOSIS — Z13.1 SCREENING FOR DIABETES MELLITUS: ICD-10-CM

## 2023-11-13 DIAGNOSIS — R53.83 FATIGUE, UNSPECIFIED TYPE: ICD-10-CM

## 2023-11-13 DIAGNOSIS — F41.8 MIXED ANXIETY AND DEPRESSIVE DISORDER: Primary | ICD-10-CM

## 2023-11-13 PROCEDURE — 99214 OFFICE O/P EST MOD 30 MIN: CPT | Performed by: STUDENT IN AN ORGANIZED HEALTH CARE EDUCATION/TRAINING PROGRAM

## 2023-11-13 RX ORDER — VENLAFAXINE HYDROCHLORIDE 150 MG/1
150 CAPSULE, EXTENDED RELEASE ORAL DAILY
Qty: 30 CAPSULE | Refills: 2 | Status: SHIPPED | OUTPATIENT
Start: 2023-11-13

## 2023-11-13 ASSESSMENT — ENCOUNTER SYMPTOMS
COUGH: 0
CHEST TIGHTNESS: 0
WHEEZING: 0
NAUSEA: 0
VOMITING: 0
SORE THROAT: 0
DIARRHEA: 0
CONSTIPATION: 0
EYE DISCHARGE: 0
SHORTNESS OF BREATH: 0
ABDOMINAL PAIN: 0

## 2023-11-13 NOTE — PROGRESS NOTES
2023    TELEHEALTH EVALUATION -- Audio/Visual    HPI:    Chaya Ireland (:  1985) has requested an audio/video evaluation for the following concern(s):    She is following up on anxiety and depression. She states that for past few months her mood has been fluctuating and she wants to try higher dose of the medication. She states that she also schedule appointment with psychotherapy. She denies any triggering events but states that her work stress has been high. She also feels tired and states that taking B12 supplements helps. She is due for her routine labs. Review of Systems   Constitutional:  Positive for fatigue. Negative for appetite change and fever. HENT:  Negative for congestion, ear pain, hearing loss and sore throat. Eyes:  Negative for discharge and visual disturbance. Respiratory:  Negative for cough, chest tightness, shortness of breath and wheezing. Cardiovascular:  Negative for chest pain, palpitations and leg swelling. Gastrointestinal:  Negative for abdominal pain, constipation, diarrhea, nausea and vomiting. Genitourinary:  Negative for flank pain, frequency, hematuria and urgency. Musculoskeletal:  Negative for arthralgias, gait problem, joint swelling and myalgias. Skin: Negative. Neurological:  Negative for dizziness, weakness, numbness and headaches. Psychiatric/Behavioral:  Positive for dysphoric mood. The patient is nervous/anxious. Prior to Visit Medications    Medication Sig Taking?  Authorizing Provider   venlafaxine (EFFEXOR XR) 150 MG extended release capsule Take 1 capsule by mouth daily Yes Aleshia Uribe MD   busPIRone (BUSPAR) 7.5 MG tablet Take 1 tablet by mouth 3 times daily  Aleshia Uribe MD   clomiPHENE (CLOMID) 50 MG tablet Use 1 tablet cycle day 4-8  Tere Clark APRN - [facility]   progesterone (ENDOMETRIN) 100 MG suppository Cycle day 16 through menses or if becomes pregnant then through first trimester  Tere Clark,

## 2023-11-15 ENCOUNTER — TELEPHONE (OUTPATIENT)
Dept: OBGYN CLINIC | Age: 38
End: 2023-11-15

## 2023-11-15 NOTE — TELEPHONE ENCOUNTER
Pt called asking to have records faxed to [facility] for IVF treatment. Records faxed to [phone]/ dalila rec'd.

## 2023-11-29 NOTE — TELEPHONE ENCOUNTER
Alem Ferreira is calling to request a refill on the following medication(s):    Medication Request:  Requested Prescriptions     Pending Prescriptions Disp Refills    busPIRone (BUSPAR) 7.5 MG tablet 60 tablet 1     Sig: Take 1 tablet by mouth 3 times daily       Last Visit Date (If Applicable):  59/57/2567    Next Visit Date:    Visit date not found

## 2023-11-30 RX ORDER — BUSPIRONE HYDROCHLORIDE 7.5 MG/1
7.5 TABLET ORAL 3 TIMES DAILY
Qty: 60 TABLET | Refills: 1 | Status: SHIPPED | OUTPATIENT
Start: 2023-11-30

## 2024-01-16 DIAGNOSIS — F41.8 MIXED ANXIETY AND DEPRESSIVE DISORDER: ICD-10-CM

## 2024-01-16 RX ORDER — VENLAFAXINE HYDROCHLORIDE 150 MG/1
150 CAPSULE, EXTENDED RELEASE ORAL DAILY
Qty: 30 CAPSULE | Refills: 2 | Status: SHIPPED | OUTPATIENT
Start: 2024-01-16

## 2024-01-16 RX ORDER — BUSPIRONE HYDROCHLORIDE 7.5 MG/1
7.5 TABLET ORAL 3 TIMES DAILY
Qty: 60 TABLET | Refills: 1 | Status: SHIPPED | OUTPATIENT
Start: 2024-01-16

## 2024-01-16 NOTE — TELEPHONE ENCOUNTER
Wen Forrest is calling to request a refill on the following medication(s):    Medication Request:  Requested Prescriptions     Pending Prescriptions Disp Refills    venlafaxine (EFFEXOR XR) 150 MG extended release capsule 30 capsule 2     Sig: Take 1 capsule by mouth daily    busPIRone (BUSPAR) 7.5 MG tablet 60 tablet 1     Sig: Take 1 tablet by mouth 3 times daily       Last Visit Date (If Applicable):  11/13/2023    Next Visit Date:    Visit date not found

## 2024-02-29 RX ORDER — BUSPIRONE HYDROCHLORIDE 7.5 MG/1
7.5 TABLET ORAL 3 TIMES DAILY
Qty: 60 TABLET | Refills: 1 | Status: SHIPPED | OUTPATIENT
Start: 2024-02-29

## 2024-02-29 NOTE — TELEPHONE ENCOUNTER
Wen Forrest is calling to request a refill on the following medication(s):    Medication Request:  Requested Prescriptions     Pending Prescriptions Disp Refills    busPIRone (BUSPAR) 7.5 MG tablet 60 tablet 1     Sig: Take 1 tablet by mouth 3 times daily       Last Visit Date (If Applicable):  11/13/2023    Next Visit Date:    Visit date not found

## 2024-03-27 ENCOUNTER — TELEPHONE (OUTPATIENT)
Dept: OBGYN CLINIC | Age: 39
End: 2024-03-27

## 2024-03-27 NOTE — TELEPHONE ENCOUNTER
Pt called asking if she would be able to have baseline labs and an ultrasound done in office. Pt would like to have done 3/28/24. Pt states she does have orders for both. Per Yennifer, office manger, okay to have labs and ultrasound done in office.

## 2024-03-28 ENCOUNTER — HOSPITAL ENCOUNTER (OUTPATIENT)
Age: 39
Discharge: HOME | End: 2024-03-28
Payer: COMMERCIAL

## 2024-03-28 ENCOUNTER — TELEPHONE (OUTPATIENT)
Dept: OBGYN | Age: 39
End: 2024-03-28

## 2024-03-28 LAB
B-HCG SERPL EIA 3RD IS-ACNC: <1 MIU/ML
ESTRADIOL LEVEL: 43 PG/ML
FSH SERPL-ACNC: 6.5 MIU/ML
LH SERPL-ACNC: 8.4 MIU/ML (ref 1.7–8.6)
PROGEST SERPL-MCNC: 0.4 NG/ML
TSH SERPL DL<=0.05 MIU/L-ACNC: 1.33 UIU/ML (ref 0.3–5)

## 2024-03-28 PROCEDURE — 36415 COLL VENOUS BLD VENIPUNCTURE: CPT

## 2024-03-28 PROCEDURE — 83002 ASSAY OF GONADOTROPIN (LH): CPT

## 2024-03-28 PROCEDURE — 83001 ASSAY OF GONADOTROPIN (FSH): CPT

## 2024-03-28 PROCEDURE — 82670 ASSAY OF TOTAL ESTRADIOL: CPT

## 2024-03-28 PROCEDURE — 84443 ASSAY THYROID STIM HORMONE: CPT

## 2024-03-28 PROCEDURE — 84702 CHORIONIC GONADOTROPIN TEST: CPT

## 2024-03-28 PROCEDURE — 84144 ASSAY OF PROGESTERONE: CPT

## 2024-03-28 NOTE — TELEPHONE ENCOUNTER
Patient seen in our clinic for non OB US for fertility issue. Paper work completed by our US tech and faxed to [phone]. Patient instructed to have requested labs drawn @ [facility] lab depart and they will forward results to ordering provider.  US result faxed to above # and confirmation and all documents scanned into media.

## 2024-04-02 ENCOUNTER — TRANSCRIBE ORDERS (OUTPATIENT)
Dept: ADMINISTRATIVE | Age: 39
End: 2024-04-02

## 2024-04-02 DIAGNOSIS — Z31.83 IN VITRO FERTILIZATION: Primary | ICD-10-CM

## 2024-04-03 ENCOUNTER — HOSPITAL ENCOUNTER (OUTPATIENT)
Age: 39
Setting detail: SPECIMEN
Discharge: HOME | End: 2024-04-03

## 2024-04-03 LAB
ESTRADIOL LEVEL: 120 PG/ML
LH SERPL-ACNC: 11.9 MIU/ML (ref 1.7–8.6)
PROGEST SERPL-MCNC: 0.25 NG/ML

## 2024-04-05 ENCOUNTER — HOSPITAL ENCOUNTER (OUTPATIENT)
Dept: ULTRASOUND IMAGING | Age: 39
Discharge: HOME | End: 2024-04-05
Payer: COMMERCIAL

## 2024-04-05 ENCOUNTER — HOSPITAL ENCOUNTER (OUTPATIENT)
Age: 39
Discharge: HOME | End: 2024-04-05
Payer: COMMERCIAL

## 2024-04-05 DIAGNOSIS — Z31.83 IN VITRO FERTILIZATION: ICD-10-CM

## 2024-04-05 LAB
ESTRADIOL LEVEL: 319 PG/ML
LH SERPL-ACNC: 3.1 MIU/ML (ref 1.7–8.6)
PROGEST SERPL-MCNC: 0.34 NG/ML

## 2024-04-05 PROCEDURE — 76830 TRANSVAGINAL US NON-OB: CPT

## 2024-04-05 PROCEDURE — 36415 COLL VENOUS BLD VENIPUNCTURE: CPT

## 2024-04-05 PROCEDURE — 83002 ASSAY OF GONADOTROPIN (LH): CPT

## 2024-04-05 PROCEDURE — 84144 ASSAY OF PROGESTERONE: CPT

## 2024-04-05 PROCEDURE — 82670 ASSAY OF TOTAL ESTRADIOL: CPT

## 2024-04-08 ENCOUNTER — HOSPITAL ENCOUNTER (OUTPATIENT)
Age: 39
Discharge: HOME | End: 2024-04-08
Payer: COMMERCIAL

## 2024-04-08 ENCOUNTER — HOSPITAL ENCOUNTER (OUTPATIENT)
Dept: WOMENS IMAGING | Age: 39
Discharge: HOME | End: 2024-04-10
Payer: COMMERCIAL

## 2024-04-08 DIAGNOSIS — Z31.83 IN VITRO FERTILIZATION: ICD-10-CM

## 2024-04-08 LAB
ESTRADIOL LEVEL: 1695 PG/ML
LH SERPL-ACNC: 16.7 MIU/ML (ref 1.7–8.6)
PROGEST SERPL-MCNC: 0.71 NG/ML

## 2024-04-08 PROCEDURE — 84144 ASSAY OF PROGESTERONE: CPT

## 2024-04-08 PROCEDURE — 83002 ASSAY OF GONADOTROPIN (LH): CPT

## 2024-04-08 PROCEDURE — 36415 COLL VENOUS BLD VENIPUNCTURE: CPT

## 2024-04-08 PROCEDURE — 76830 TRANSVAGINAL US NON-OB: CPT

## 2024-04-08 PROCEDURE — 82670 ASSAY OF TOTAL ESTRADIOL: CPT

## 2024-04-26 DIAGNOSIS — F41.8 MIXED ANXIETY AND DEPRESSIVE DISORDER: ICD-10-CM

## 2024-04-29 RX ORDER — VENLAFAXINE HYDROCHLORIDE 150 MG/1
150 CAPSULE, EXTENDED RELEASE ORAL DAILY
Qty: 30 CAPSULE | Refills: 0 | Status: SHIPPED | OUTPATIENT
Start: 2024-04-29 | End: 2024-05-23 | Stop reason: SDUPTHER

## 2024-04-29 NOTE — TELEPHONE ENCOUNTER
Wen Forrest is calling to request a refill on the following medication(s):    Medication Request:  Requested Prescriptions     Pending Prescriptions Disp Refills    venlafaxine (EFFEXOR XR) 150 MG extended release capsule 30 capsule 2     Sig: Take 1 capsule by mouth daily       Last Visit Date (If Applicable):  11/13/2023    Next Visit Date:    Visit date not found

## 2024-05-07 RX ORDER — BUSPIRONE HYDROCHLORIDE 7.5 MG/1
7.5 TABLET ORAL 3 TIMES DAILY
Qty: 60 TABLET | Refills: 0 | Status: SHIPPED | OUTPATIENT
Start: 2024-05-07 | End: 2024-05-23 | Stop reason: SDUPTHER

## 2024-05-21 SDOH — ECONOMIC STABILITY: FOOD INSECURITY: WITHIN THE PAST 12 MONTHS, THE FOOD YOU BOUGHT JUST DIDN'T LAST AND YOU DIDN'T HAVE MONEY TO GET MORE.: NEVER TRUE

## 2024-05-21 SDOH — ECONOMIC STABILITY: INCOME INSECURITY: HOW HARD IS IT FOR YOU TO PAY FOR THE VERY BASICS LIKE FOOD, HOUSING, MEDICAL CARE, AND HEATING?: NOT VERY HARD

## 2024-05-21 SDOH — ECONOMIC STABILITY: FOOD INSECURITY: WITHIN THE PAST 12 MONTHS, YOU WORRIED THAT YOUR FOOD WOULD RUN OUT BEFORE YOU GOT MONEY TO BUY MORE.: SOMETIMES TRUE

## 2024-05-23 ENCOUNTER — TELEMEDICINE (OUTPATIENT)
Dept: FAMILY MEDICINE CLINIC | Age: 39
End: 2024-05-23
Payer: COMMERCIAL

## 2024-05-23 DIAGNOSIS — F41.8 MIXED ANXIETY AND DEPRESSIVE DISORDER: ICD-10-CM

## 2024-05-23 PROCEDURE — 99213 OFFICE O/P EST LOW 20 MIN: CPT | Performed by: STUDENT IN AN ORGANIZED HEALTH CARE EDUCATION/TRAINING PROGRAM

## 2024-05-23 RX ORDER — VENLAFAXINE HYDROCHLORIDE 150 MG/1
150 CAPSULE, EXTENDED RELEASE ORAL DAILY
Qty: 90 CAPSULE | Refills: 1 | Status: SHIPPED | OUTPATIENT
Start: 2024-05-23

## 2024-05-23 RX ORDER — BUSPIRONE HYDROCHLORIDE 7.5 MG/1
7.5 TABLET ORAL 2 TIMES DAILY
Qty: 180 TABLET | Refills: 1 | Status: SHIPPED | OUTPATIENT
Start: 2024-05-23

## 2024-05-23 ASSESSMENT — PATIENT HEALTH QUESTIONNAIRE - PHQ9
1. LITTLE INTEREST OR PLEASURE IN DOING THINGS: NOT AT ALL
10. IF YOU CHECKED OFF ANY PROBLEMS, HOW DIFFICULT HAVE THESE PROBLEMS MADE IT FOR YOU TO DO YOUR WORK, TAKE CARE OF THINGS AT HOME, OR GET ALONG WITH OTHER PEOPLE: NOT DIFFICULT AT ALL
SUM OF ALL RESPONSES TO PHQ QUESTIONS 1-9: 2
7. TROUBLE CONCENTRATING ON THINGS, SUCH AS READING THE NEWSPAPER OR WATCHING TELEVISION: MORE THAN HALF THE DAYS
SUM OF ALL RESPONSES TO PHQ QUESTIONS 1-9: 2
6. FEELING BAD ABOUT YOURSELF - OR THAT YOU ARE A FAILURE OR HAVE LET YOURSELF OR YOUR FAMILY DOWN: NOT AT ALL
SUM OF ALL RESPONSES TO PHQ9 QUESTIONS 1 & 2: 0
SUM OF ALL RESPONSES TO PHQ QUESTIONS 1-9: 2
4. FEELING TIRED OR HAVING LITTLE ENERGY: NOT AT ALL
3. TROUBLE FALLING OR STAYING ASLEEP: NOT AT ALL
5. POOR APPETITE OR OVEREATING: NOT AT ALL
9. THOUGHTS THAT YOU WOULD BE BETTER OFF DEAD, OR OF HURTING YOURSELF: NOT AT ALL
2. FEELING DOWN, DEPRESSED OR HOPELESS: NOT AT ALL
SUM OF ALL RESPONSES TO PHQ QUESTIONS 1-9: 2
8. MOVING OR SPEAKING SO SLOWLY THAT OTHER PEOPLE COULD HAVE NOTICED. OR THE OPPOSITE, BEING SO FIGETY OR RESTLESS THAT YOU HAVE BEEN MOVING AROUND A LOT MORE THAN USUAL: NOT AT ALL

## 2024-05-23 ASSESSMENT — ENCOUNTER SYMPTOMS
ABDOMINAL PAIN: 0
CHEST TIGHTNESS: 0
WHEEZING: 0
NAUSEA: 0
VOMITING: 0
EYE DISCHARGE: 0
SHORTNESS OF BREATH: 0
DIARRHEA: 0
COUGH: 0
CONSTIPATION: 0
SORE THROAT: 0

## 2024-05-23 NOTE — PROGRESS NOTES
2024    TELEHEALTH EVALUATION -- Audio/Visual    HPI:    Wen Forrest (:  1985) has requested an audio/video evaluation for the following concern(s):    She is following up on anxiety and depression.  She states that the higher dose of Effexor has been working very well for her.  She also takes BuSpar twice a day.  She is currently following with OB/GYN for her IVF treatments.    Review of Systems   Constitutional:  Negative for appetite change, fatigue and fever.   HENT:  Negative for congestion, ear pain, hearing loss and sore throat.    Eyes:  Negative for discharge and visual disturbance.   Respiratory:  Negative for cough, chest tightness, shortness of breath and wheezing.    Cardiovascular:  Negative for chest pain, palpitations and leg swelling.   Gastrointestinal:  Negative for abdominal pain, constipation, diarrhea, nausea and vomiting.   Genitourinary:  Negative for flank pain, frequency, hematuria and urgency.   Musculoskeletal:  Negative for arthralgias, gait problem, joint swelling and myalgias.   Skin: Negative.    Neurological:  Negative for dizziness, weakness, numbness and headaches.   Psychiatric/Behavioral: Negative.  Negative for dysphoric mood. The patient is not nervous/anxious.        Prior to Visit Medications    Medication Sig Taking? Authorizing Provider   venlafaxine (EFFEXOR XR) 150 MG extended release capsule Take 1 capsule by mouth daily Yes Saba Rhodes MD   busPIRone (BUSPAR) 7.5 MG tablet Take 1 tablet by mouth 2 times daily Yes Saba Rhodes MD   clomiPHENE (CLOMID) 50 MG tablet Use 1 tablet cycle day 4-8 Yes Tere Clark APRN - CNM   progesterone (ENDOMETRIN) 100 MG suppository Cycle day 16 through menses or if becomes pregnant then through first trimester Yes Tere Clark APRN - CNM   ALBUTEROL IN Inhale into the lungs Yes Provider, MD Nicolás   albuterol sulfate HFA (VENTOLIN HFA) 108 (90 Base) MCG/ACT inhaler Inhale 2 puffs into the lungs 4

## 2024-06-24 ENCOUNTER — TRANSCRIBE ORDERS (OUTPATIENT)
Dept: ADMINISTRATIVE | Age: 39
End: 2024-06-24

## 2024-06-24 DIAGNOSIS — Z31.83 ENCOUNTER FOR ASSISTED REPRODUCTIVE FERTILITY CYCLE: Primary | ICD-10-CM

## 2024-06-25 ENCOUNTER — HOSPITAL ENCOUNTER (OUTPATIENT)
Dept: ULTRASOUND IMAGING | Age: 39
Discharge: HOME | End: 2024-06-27
Payer: COMMERCIAL

## 2024-06-25 ENCOUNTER — HOSPITAL ENCOUNTER (OUTPATIENT)
Age: 39
Discharge: HOME | End: 2024-06-25
Payer: COMMERCIAL

## 2024-06-25 DIAGNOSIS — Z31.83 ENCOUNTER FOR ASSISTED REPRODUCTIVE FERTILITY CYCLE: ICD-10-CM

## 2024-06-25 LAB
B-HCG SERPL EIA 3RD IS-ACNC: <1 MIU/ML
TSH SERPL DL<=0.05 MIU/L-ACNC: 1.03 UIU/ML (ref 0.3–5)

## 2024-06-25 PROCEDURE — 84144 ASSAY OF PROGESTERONE: CPT

## 2024-06-25 PROCEDURE — 93976 VASCULAR STUDY: CPT

## 2024-06-25 PROCEDURE — 82670 ASSAY OF TOTAL ESTRADIOL: CPT

## 2024-06-25 PROCEDURE — 36415 COLL VENOUS BLD VENIPUNCTURE: CPT

## 2024-06-25 PROCEDURE — 83002 ASSAY OF GONADOTROPIN (LH): CPT

## 2024-06-25 PROCEDURE — 84443 ASSAY THYROID STIM HORMONE: CPT

## 2024-06-25 PROCEDURE — 83001 ASSAY OF GONADOTROPIN (FSH): CPT

## 2024-06-25 PROCEDURE — 84702 CHORIONIC GONADOTROPIN TEST: CPT

## 2024-06-26 LAB
ESTRADIOL LEVEL: 38 PG/ML
FSH SERPL-ACNC: 9.5 MIU/ML
LH SERPL-ACNC: 14 MIU/ML (ref 1.7–8.6)
PROGEST SERPL-MCNC: 0.44 NG/ML

## 2024-07-01 ENCOUNTER — HOSPITAL ENCOUNTER (OUTPATIENT)
Age: 39
Discharge: HOME | End: 2024-07-01
Payer: COMMERCIAL

## 2024-07-01 ENCOUNTER — HOSPITAL ENCOUNTER (OUTPATIENT)
Dept: ULTRASOUND IMAGING | Age: 39
Discharge: HOME | End: 2024-07-03
Payer: COMMERCIAL

## 2024-07-01 DIAGNOSIS — Z31.83 IN VITRO FERTILIZATION: ICD-10-CM

## 2024-07-01 LAB
ESTRADIOL LEVEL: 143 PG/ML
LH SERPL-ACNC: 20.9 MIU/ML (ref 1.7–8.6)
PROGEST SERPL-MCNC: <0.05 NG/ML

## 2024-07-01 PROCEDURE — 84144 ASSAY OF PROGESTERONE: CPT

## 2024-07-01 PROCEDURE — 82670 ASSAY OF TOTAL ESTRADIOL: CPT

## 2024-07-01 PROCEDURE — 76830 TRANSVAGINAL US NON-OB: CPT

## 2024-07-01 PROCEDURE — 36415 COLL VENOUS BLD VENIPUNCTURE: CPT

## 2024-07-01 PROCEDURE — 83002 ASSAY OF GONADOTROPIN (LH): CPT

## 2024-07-12 ENCOUNTER — HOSPITAL ENCOUNTER (OUTPATIENT)
Age: 39
Discharge: HOME | End: 2024-07-12
Payer: COMMERCIAL

## 2024-07-12 LAB
ESTRADIOL LEVEL: 2349 PG/ML
PROGEST SERPL-MCNC: 35.9 NG/ML

## 2024-07-12 PROCEDURE — 82670 ASSAY OF TOTAL ESTRADIOL: CPT

## 2024-07-12 PROCEDURE — 36415 COLL VENOUS BLD VENIPUNCTURE: CPT

## 2024-07-12 PROCEDURE — 84144 ASSAY OF PROGESTERONE: CPT

## 2024-07-17 ENCOUNTER — HOSPITAL ENCOUNTER (OUTPATIENT)
Age: 39
Discharge: HOME | End: 2024-07-17
Payer: COMMERCIAL

## 2024-07-17 LAB
B-HCG SERPL EIA 3RD IS-ACNC: 123.2 MIU/ML
ESTRADIOL LEVEL: 508 PG/ML
PROGEST SERPL-MCNC: 40.8 NG/ML

## 2024-07-17 PROCEDURE — 84702 CHORIONIC GONADOTROPIN TEST: CPT

## 2024-07-17 PROCEDURE — 82670 ASSAY OF TOTAL ESTRADIOL: CPT

## 2024-07-17 PROCEDURE — 84144 ASSAY OF PROGESTERONE: CPT

## 2024-07-17 PROCEDURE — 36415 COLL VENOUS BLD VENIPUNCTURE: CPT

## 2024-07-19 ENCOUNTER — HOSPITAL ENCOUNTER (OUTPATIENT)
Age: 39
Discharge: HOME | End: 2024-07-19
Payer: COMMERCIAL

## 2024-07-19 LAB
B-HCG SERPL EIA 3RD IS-ACNC: 302.2 MIU/ML
ESTRADIOL LEVEL: 1905 PG/ML
PROGEST SERPL-MCNC: 34.6 NG/ML
TSH SERPL DL<=0.05 MIU/L-ACNC: 1.67 UIU/ML (ref 0.3–5)

## 2024-07-19 PROCEDURE — 36415 COLL VENOUS BLD VENIPUNCTURE: CPT

## 2024-07-19 PROCEDURE — 82670 ASSAY OF TOTAL ESTRADIOL: CPT

## 2024-07-19 PROCEDURE — 84144 ASSAY OF PROGESTERONE: CPT

## 2024-07-19 PROCEDURE — 84443 ASSAY THYROID STIM HORMONE: CPT

## 2024-07-19 PROCEDURE — 84702 CHORIONIC GONADOTROPIN TEST: CPT

## 2024-07-25 ENCOUNTER — HOSPITAL ENCOUNTER (OUTPATIENT)
Age: 39
Discharge: HOME | End: 2024-07-25
Payer: COMMERCIAL

## 2024-07-25 LAB
B-HCG SERPL EIA 3RD IS-ACNC: 4278 MIU/ML (ref 0–7)
ESTRADIOL LEVEL: 1834 PG/ML
PROGEST SERPL-MCNC: 39.4 NG/ML

## 2024-07-25 PROCEDURE — 84144 ASSAY OF PROGESTERONE: CPT

## 2024-07-25 PROCEDURE — 36415 COLL VENOUS BLD VENIPUNCTURE: CPT

## 2024-07-25 PROCEDURE — 84702 CHORIONIC GONADOTROPIN TEST: CPT

## 2024-07-25 PROCEDURE — 82670 ASSAY OF TOTAL ESTRADIOL: CPT

## 2024-07-31 ENCOUNTER — OFFICE VISIT (OUTPATIENT)
Dept: OBGYN CLINIC | Age: 39
End: 2024-07-31
Payer: COMMERCIAL

## 2024-07-31 VITALS — SYSTOLIC BLOOD PRESSURE: 114 MMHG | WEIGHT: 176 LBS | BODY MASS INDEX: 28.41 KG/M2 | DIASTOLIC BLOOD PRESSURE: 68 MMHG

## 2024-07-31 DIAGNOSIS — N91.1 AMENORRHEA, SECONDARY: Primary | ICD-10-CM

## 2024-07-31 PROCEDURE — 99213 OFFICE O/P EST LOW 20 MIN: CPT | Performed by: NURSE PRACTITIONER

## 2024-08-01 ENCOUNTER — HOSPITAL ENCOUNTER (OUTPATIENT)
Age: 39
Discharge: HOME | End: 2024-08-01
Payer: COMMERCIAL

## 2024-08-01 LAB
B-HCG SERPL EIA 3RD IS-ACNC: ABNORMAL MIU/ML
ESTRADIOL LEVEL: 2402 PG/ML
PROGEST SERPL-MCNC: 27 NG/ML

## 2024-08-01 PROCEDURE — 82670 ASSAY OF TOTAL ESTRADIOL: CPT

## 2024-08-01 PROCEDURE — 84702 CHORIONIC GONADOTROPIN TEST: CPT

## 2024-08-01 PROCEDURE — 36415 COLL VENOUS BLD VENIPUNCTURE: CPT

## 2024-08-01 PROCEDURE — 84144 ASSAY OF PROGESTERONE: CPT

## 2024-08-08 ENCOUNTER — HOSPITAL ENCOUNTER (OUTPATIENT)
Dept: ULTRASOUND IMAGING | Age: 39
Discharge: HOME | End: 2024-07-27
Payer: COMMERCIAL

## 2024-08-08 DIAGNOSIS — Z31.83 ENCOUNTER FOR ASSISTED REPRODUCTIVE FERTILITY CYCLE: ICD-10-CM

## 2024-08-12 ASSESSMENT — ENCOUNTER SYMPTOMS
GASTROINTESTINAL NEGATIVE: 1
RESPIRATORY NEGATIVE: 1

## 2024-08-27 ENCOUNTER — OFFICE VISIT (OUTPATIENT)
Dept: OBGYN CLINIC | Age: 39
End: 2024-08-27
Payer: COMMERCIAL

## 2024-08-27 VITALS — WEIGHT: 176.7 LBS | BODY MASS INDEX: 28.52 KG/M2 | SYSTOLIC BLOOD PRESSURE: 110 MMHG | DIASTOLIC BLOOD PRESSURE: 72 MMHG

## 2024-08-27 DIAGNOSIS — N91.1 AMENORRHEA, SECONDARY: Primary | ICD-10-CM

## 2024-08-27 PROCEDURE — 99213 OFFICE O/P EST LOW 20 MIN: CPT | Performed by: NURSE PRACTITIONER

## 2024-08-27 RX ORDER — PROGESTERONE 200 MG/1
CAPSULE ORAL
COMMUNITY
Start: 2024-07-29

## 2024-08-27 ASSESSMENT — ENCOUNTER SYMPTOMS
RESPIRATORY NEGATIVE: 1
VOMITING: 0
CONSTIPATION: 0
NAUSEA: 1
DIARRHEA: 0

## 2024-08-27 NOTE — PROGRESS NOTES
PROBLEM VISIT     Date of service: 2024    Wen Forrest  Is a 38 y.o. female    PT's PCP is: Saba Rhodes MD     : 1985                                             Subjective:       Patient's last menstrual period was 2024.   OB History    Para Term  AB Living   1             SAB IAB Ectopic Molar Multiple Live Births                    # Outcome Date GA Lbr Young/2nd Weight Sex Type Anes PTL Lv   1 Current                 Social History     Tobacco Use   Smoking Status Never   Smokeless Tobacco Never        Social History     Substance and Sexual Activity   Alcohol Use Not Currently    Comment: once a month       Allergies: Patient has no known allergies.      Current Outpatient Medications:     progesterone (PROMETRIUM) 200 MG CAPS capsule, Insert one (1) capsule vaginally twice daily as directed, Disp: , Rfl:     venlafaxine (EFFEXOR XR) 150 MG extended release capsule, Take 1 capsule by mouth daily, Disp: 90 capsule, Rfl: 1    busPIRone (BUSPAR) 7.5 MG tablet, Take 1 tablet by mouth 2 times daily, Disp: 180 tablet, Rfl: 1    albuterol sulfate HFA (VENTOLIN HFA) 108 (90 Base) MCG/ACT inhaler, Inhale 2 puffs into the lungs 4 times daily as needed for Wheezing, Disp: 18 g, Rfl: 0    Prenatal MV-Min-Fe Fum-FA-DHA (PRENATAL 1 PO), Take by mouth, Disp: , Rfl:     hydrOXYzine HCl (ATARAX) 25 MG tablet, Take 1 tablet by mouth as needed for Itching (Patient not taking: Reported on 2024), Disp: , Rfl:     FEXOFENADINE HCL PO, Take by mouth, Disp: , Rfl:     Social History     Substance and Sexual Activity   Sexual Activity Yes    Partners: Male         Chief Complaint   Patient presents with    Follow-up     Follow up viability usn. Voices no concerns.        PE:  Vital Signs  Blood pressure 110/72, weight 80.2 kg (176 lb 11.2 oz), last menstrual period 2024, not currently breastfeeding.     HPI: Patient presents today following viability ultrasound. Now experiencing

## 2024-09-05 ENCOUNTER — INITIAL PRENATAL (OUTPATIENT)
Dept: OBGYN CLINIC | Age: 39
End: 2024-09-05
Payer: COMMERCIAL

## 2024-09-05 ENCOUNTER — HOSPITAL ENCOUNTER (OUTPATIENT)
Age: 39
Setting detail: SPECIMEN
Discharge: HOME | End: 2024-09-05

## 2024-09-05 VITALS
SYSTOLIC BLOOD PRESSURE: 108 MMHG | WEIGHT: 178.7 LBS | HEIGHT: 66 IN | BODY MASS INDEX: 28.72 KG/M2 | DIASTOLIC BLOOD PRESSURE: 80 MMHG

## 2024-09-05 DIAGNOSIS — Z34.01 ENCOUNTER FOR SUPERVISION OF NORMAL FIRST PREGNANCY IN FIRST TRIMESTER: ICD-10-CM

## 2024-09-05 DIAGNOSIS — Z34.01 ENCOUNTER FOR SUPERVISION OF NORMAL FIRST PREGNANCY IN FIRST TRIMESTER: Primary | ICD-10-CM

## 2024-09-05 LAB
ABO + RH BLD: NORMAL
AMPHET UR QL SCN: NEGATIVE
BARBITURATES UR QL SCN: NEGATIVE
BASOPHILS # BLD: 0.03 K/UL (ref 0–0.2)
BASOPHILS NFR BLD: 0 % (ref 0–2)
BENZODIAZ UR QL: NEGATIVE
BILIRUB UR QL STRIP: NEGATIVE
BLOOD GROUP ANTIBODIES SERPL: NEGATIVE
CANNABINOIDS UR QL SCN: NEGATIVE
CLARITY UR: CLEAR
COCAINE UR QL SCN: NEGATIVE
COLOR UR: YELLOW
COMMENT: NORMAL
EOSINOPHIL # BLD: 0.26 K/UL (ref 0–0.44)
EOSINOPHILS RELATIVE PERCENT: 3 % (ref 1–4)
ERYTHROCYTE [DISTWIDTH] IN BLOOD BY AUTOMATED COUNT: 13 % (ref 11.8–14.4)
FENTANYL UR QL: NEGATIVE
GLUCOSE UR STRIP-MCNC: NEGATIVE MG/DL
HBV SURFACE AG SERPL QL IA: NONREACTIVE
HCT VFR BLD AUTO: 41.8 % (ref 36.3–47.1)
HCV AB SERPL QL IA: NONREACTIVE
HGB BLD-MCNC: 14.1 G/DL (ref 11.9–15.1)
HGB UR QL STRIP.AUTO: NEGATIVE
IMM GRANULOCYTES # BLD AUTO: 0.03 K/UL (ref 0–0.3)
IMM GRANULOCYTES NFR BLD: 0 %
KETONES UR STRIP-MCNC: NEGATIVE MG/DL
LEUKOCYTE ESTERASE UR QL STRIP: NEGATIVE
LYMPHOCYTES NFR BLD: 1.42 K/UL (ref 1.1–3.7)
LYMPHOCYTES RELATIVE PERCENT: 18 % (ref 24–43)
MCH RBC QN AUTO: 32 PG (ref 25.2–33.5)
MCHC RBC AUTO-ENTMCNC: 33.7 G/DL (ref 28.4–34.8)
MCV RBC AUTO: 94.8 FL (ref 82.6–102.9)
METHADONE UR QL: NEGATIVE
MONOCYTES NFR BLD: 0.63 K/UL (ref 0.1–1.2)
MONOCYTES NFR BLD: 8 % (ref 3–12)
NEUTROPHILS NFR BLD: 71 % (ref 36–65)
NEUTS SEG NFR BLD: 5.53 K/UL (ref 1.5–8.1)
NITRITE UR QL STRIP: NEGATIVE
NRBC BLD-RTO: 0 PER 100 WBC
OPIATES UR QL SCN: NEGATIVE
OXYCODONE UR QL SCN: NEGATIVE
PCP UR QL SCN: NEGATIVE
PH UR STRIP: 6.5 [PH] (ref 5–8)
PLATELET # BLD AUTO: 284 K/UL (ref 138–453)
PMV BLD AUTO: 9.2 FL (ref 8.1–13.5)
PROT UR STRIP-MCNC: NEGATIVE MG/DL
RBC # BLD AUTO: 4.41 M/UL (ref 3.95–5.11)
RUBV IGG SERPL QL IA: 203 IU/ML
SP GR UR STRIP: 1.01 (ref 1–1.03)
T PALLIDUM AB SER QL IA: NONREACTIVE
TEST INFORMATION: NORMAL
UROBILINOGEN UR STRIP-ACNC: NORMAL EU/DL (ref 0–1)
WBC OTHER # BLD: 7.9 K/UL (ref 3.5–11.3)

## 2024-09-05 PROCEDURE — 0500F INITIAL PRENATAL CARE VISIT: CPT | Performed by: ADVANCED PRACTICE MIDWIFE

## 2024-09-05 NOTE — PROGRESS NOTES
Here for PNI, no complaints today. Denies hx of MRSA. Declines genetic testing today.  drawn today. FOB states his mother has Charcot-Anastasiia tooth disease and that he is exhibiting signs but has not been diagnosed. Has NOB appt 9/19/24. Questions answered and forms signed.

## 2024-09-06 LAB
CHLAMYDIA DNA UR QL NAA+PROBE: NEGATIVE
HIV 1+2 AB+HIV1 P24 AG SERPL QL IA: NONREACTIVE
MICROORGANISM SPEC CULT: NORMAL
N GONORRHOEA DNA UR QL NAA+PROBE: NEGATIVE
SERVICE CMNT-IMP: NORMAL
SPECIMEN DESCRIPTION: NORMAL
SPECIMEN DESCRIPTION: NORMAL

## 2024-09-19 ENCOUNTER — INITIAL PRENATAL (OUTPATIENT)
Dept: OBGYN CLINIC | Age: 39
End: 2024-09-19

## 2024-09-19 VITALS — BODY MASS INDEX: 28.57 KG/M2 | DIASTOLIC BLOOD PRESSURE: 64 MMHG | SYSTOLIC BLOOD PRESSURE: 108 MMHG | WEIGHT: 177 LBS

## 2024-09-19 DIAGNOSIS — R51.9 PREGNANCY HEADACHE IN SECOND TRIMESTER: ICD-10-CM

## 2024-09-19 DIAGNOSIS — O26.892 PREGNANCY HEADACHE IN SECOND TRIMESTER: ICD-10-CM

## 2024-09-19 DIAGNOSIS — Z3A.13 13 WEEKS GESTATION OF PREGNANCY: ICD-10-CM

## 2024-09-19 DIAGNOSIS — O09.512 ENCOUNTER FOR SUPERVISION OF PRIMIGRAVIDA OF ADVANCED MATERNAL AGE IN SECOND TRIMESTER: Primary | ICD-10-CM

## 2024-09-19 PROCEDURE — 0500F INITIAL PRENATAL CARE VISIT: CPT | Performed by: NURSE PRACTITIONER

## 2024-10-03 ENCOUNTER — TELEPHONE (OUTPATIENT)
Dept: OBGYN CLINIC | Age: 39
End: 2024-10-03

## 2024-10-03 DIAGNOSIS — Z79.899 LONG-TERM CURRENT USE OF ANTIDEPRESSANT: ICD-10-CM

## 2024-10-03 DIAGNOSIS — O99.322 DRUG USE AFFECTING PREGNANCY IN SECOND TRIMESTER: Primary | ICD-10-CM

## 2024-10-03 NOTE — TELEPHONE ENCOUNTER
Okay.    Anytime women are on medications similar to these in pregnancy it very much is a risk vs benefit ratio discussion.      Mental health and wellbeing are very important during and after pregnancy.      Often if someone has been on medications prior to the pregnancy it is best to keep them on the same medications.  If they are on very high doses of a single medication of on multiple medications then best to evaluate fetal heart closer during the pregnancy.      Per xiao  Effexor \"weigh risk/benefit during pregnancy yuri in 3rd trimester; risk of fetal harm low, though risk of  withdrawal symptoms or serotonin syndrome based on limited human data\".  Can register in national pregnancy registry for antidepressants for prolonged data collection    Buspar \"may use during pregnancy; risk of teratogenicity not expected based on limited human data; no known risk of fetal harm based on animal data\".     She also is a high risk pregnancy due to AMA and IVF so consult with MFM for echo is also very beneficial

## 2024-10-03 NOTE — TELEPHONE ENCOUNTER
I spoke with pt and let her know Tere's response. She states understanding and has no further questions regarding this at this time. She said that her insurance won't cover [facility] and is going to have to transfer care to [facility]. She is aware she will need to sign a release of records before we can fax records to new provider. She would like me to go ahead and send referral to [facility] for consultation of medications and fetal echo. I did explain to her that they are a [facility] provider also but she would like me to proceed with the referral. She states she will call me back if she needs referral sent elsewhere.

## 2024-10-10 ENCOUNTER — TELEPHONE (OUTPATIENT)
Dept: OBGYN CLINIC | Age: 39
End: 2024-10-10

## 2024-10-10 NOTE — TELEPHONE ENCOUNTER
Pt has chosen to transfer to [facility]. OB records faxed to their office/ confirmation of success received.

## 2024-11-11 DIAGNOSIS — F41.8 MIXED ANXIETY AND DEPRESSIVE DISORDER: ICD-10-CM

## 2024-11-11 NOTE — TELEPHONE ENCOUNTER
Requested Prescriptions     Pending Prescriptions Disp Refills    venlafaxine (EFFEXOR XR) 150 MG extended release capsule 90 capsule 1     Sig: Take 1 capsule by mouth daily

## 2024-11-12 NOTE — TELEPHONE ENCOUNTER
Please check with her if she is still taking this medication, she recently had a positive pregnancy test

## 2024-11-13 RX ORDER — VENLAFAXINE HYDROCHLORIDE 150 MG/1
150 CAPSULE, EXTENDED RELEASE ORAL DAILY
Qty: 90 CAPSULE | Refills: 1 | Status: SHIPPED | OUTPATIENT
Start: 2024-11-13

## 2025-09-01 DIAGNOSIS — J45.20 MILD INTERMITTENT ASTHMA WITHOUT COMPLICATION: ICD-10-CM

## 2025-09-02 RX ORDER — ALBUTEROL SULFATE 90 UG/1
INHALANT RESPIRATORY (INHALATION)
Qty: 18 G | Refills: 0 | OUTPATIENT
Start: 2025-09-02